# Patient Record
Sex: FEMALE | Race: BLACK OR AFRICAN AMERICAN | NOT HISPANIC OR LATINO | URBAN - METROPOLITAN AREA
[De-identification: names, ages, dates, MRNs, and addresses within clinical notes are randomized per-mention and may not be internally consistent; named-entity substitution may affect disease eponyms.]

---

## 2018-12-01 ENCOUNTER — OUTPATIENT (OUTPATIENT)
Dept: OUTPATIENT SERVICES | Facility: HOSPITAL | Age: 43
LOS: 1 days | End: 2018-12-01
Payer: MEDICAID

## 2018-12-01 PROCEDURE — G9001: CPT

## 2018-12-07 ENCOUNTER — OUTPATIENT (OUTPATIENT)
Dept: OUTPATIENT SERVICES | Facility: HOSPITAL | Age: 43
LOS: 1 days | End: 2018-12-07
Payer: MEDICAID

## 2018-12-07 VITALS
RESPIRATION RATE: 14 BRPM | TEMPERATURE: 98 F | SYSTOLIC BLOOD PRESSURE: 130 MMHG | HEART RATE: 85 BPM | DIASTOLIC BLOOD PRESSURE: 80 MMHG | WEIGHT: 201.06 LBS | HEIGHT: 66.5 IN | OXYGEN SATURATION: 98 %

## 2018-12-07 DIAGNOSIS — Z98.891 HISTORY OF UTERINE SCAR FROM PREVIOUS SURGERY: Chronic | ICD-10-CM

## 2018-12-07 DIAGNOSIS — D25.9 LEIOMYOMA OF UTERUS, UNSPECIFIED: ICD-10-CM

## 2018-12-07 DIAGNOSIS — D21.9 BENIGN NEOPLASM OF CONNECTIVE AND OTHER SOFT TISSUE, UNSPECIFIED: ICD-10-CM

## 2018-12-07 DIAGNOSIS — I10 ESSENTIAL (PRIMARY) HYPERTENSION: ICD-10-CM

## 2018-12-07 DIAGNOSIS — Z98.890 OTHER SPECIFIED POSTPROCEDURAL STATES: Chronic | ICD-10-CM

## 2018-12-07 LAB
BLD GP AB SCN SERPL QL: NEGATIVE — SIGNIFICANT CHANGE UP
BUN SERPL-MCNC: 7 MG/DL — SIGNIFICANT CHANGE UP (ref 7–23)
CALCIUM SERPL-MCNC: 9 MG/DL — SIGNIFICANT CHANGE UP (ref 8.4–10.5)
CHLORIDE SERPL-SCNC: 102 MMOL/L — SIGNIFICANT CHANGE UP (ref 98–107)
CO2 SERPL-SCNC: 24 MMOL/L — SIGNIFICANT CHANGE UP (ref 22–31)
CREAT SERPL-MCNC: 0.87 MG/DL — SIGNIFICANT CHANGE UP (ref 0.5–1.3)
GLUCOSE SERPL-MCNC: 80 MG/DL — SIGNIFICANT CHANGE UP (ref 70–99)
HBA1C BLD-MCNC: 5.5 % — SIGNIFICANT CHANGE UP (ref 4–5.6)
HCT VFR BLD CALC: 35.7 % — SIGNIFICANT CHANGE UP (ref 34.5–45)
HGB BLD-MCNC: 11.1 G/DL — LOW (ref 11.5–15.5)
MCHC RBC-ENTMCNC: 25.6 PG — LOW (ref 27–34)
MCHC RBC-ENTMCNC: 31.1 % — LOW (ref 32–36)
MCV RBC AUTO: 82.3 FL — SIGNIFICANT CHANGE UP (ref 80–100)
NRBC # FLD: 0 — SIGNIFICANT CHANGE UP
PLATELET # BLD AUTO: 264 K/UL — SIGNIFICANT CHANGE UP (ref 150–400)
PMV BLD: 11.4 FL — SIGNIFICANT CHANGE UP (ref 7–13)
POTASSIUM SERPL-MCNC: 3.9 MMOL/L — SIGNIFICANT CHANGE UP (ref 3.5–5.3)
POTASSIUM SERPL-SCNC: 3.9 MMOL/L — SIGNIFICANT CHANGE UP (ref 3.5–5.3)
RBC # BLD: 4.34 M/UL — SIGNIFICANT CHANGE UP (ref 3.8–5.2)
RBC # FLD: 15.6 % — HIGH (ref 10.3–14.5)
RH IG SCN BLD-IMP: POSITIVE — SIGNIFICANT CHANGE UP
SODIUM SERPL-SCNC: 137 MMOL/L — SIGNIFICANT CHANGE UP (ref 135–145)
WBC # BLD: 8.87 K/UL — SIGNIFICANT CHANGE UP (ref 3.8–10.5)
WBC # FLD AUTO: 8.87 K/UL — SIGNIFICANT CHANGE UP (ref 3.8–10.5)

## 2018-12-07 PROCEDURE — 93010 ELECTROCARDIOGRAM REPORT: CPT

## 2018-12-07 RX ORDER — SODIUM CHLORIDE 9 MG/ML
1000 INJECTION, SOLUTION INTRAVENOUS
Qty: 0 | Refills: 0 | Status: DISCONTINUED | OUTPATIENT
Start: 2018-12-21 | End: 2018-12-22

## 2018-12-07 NOTE — H&P PST ADULT - HISTORY OF PRESENT ILLNESS
43 yr old female with medical hx of htn and anemia presents for preop evaluation with c/o "heavy vaginal bleeding and abdominal pains with menstruation and dyspareunia.  Pt s/p sonogram and MRI and was dx with Leiomyoma of Uterus.  Pt is now scheduled for Abdominal Supracervical Hysterectomy, Bilateral Salpingectomy on 12/21/18.

## 2018-12-07 NOTE — H&P PST ADULT - ASSESSMENT
44 yo P3 with symptomatic fibroid uterus presents to hospital for abdominal supracervical hysterectomy and b/l salpingectomy. The patient has long standing history of heavy menses, abdominal pain, increased abdominal girth, bloating. Pelvic ultrasound reveals enlarged myomatous uterus ~ 16-18wk size with a large 8cm intracavitary myoma. The patient was counseled about the associated risks and benefits of all options including but not limited to lupron, embolization, myomectomy, etc. The patient is requesting definitive therapy because she has completed childbearing and is nervous fibroids will grow back.  Consent obtained and witnessed in the office and in the hospital. Risks of surgery include but not limited to scar tissue formation, bleeding, infection, injury to nearby organs especially due to history of abdominal surgeries. The patient verbalized understanding. All questions and concerns addressed to full satisfaction. Anticipate uncomplicated intraop and postop course. MBeauvil

## 2018-12-07 NOTE — H&P PST ADULT - PROBLEM SELECTOR PLAN 1
Scheduled for Abdominal Supracervical Hysterectomy, Bilateral Salpingectomy on 12/21/18.  Lab results pending.  Preop, famotidine, chlorhexidine and urine collection instructions provided and questions addressed.

## 2018-12-07 NOTE — H&P PST ADULT - RS GEN PE MLT RESP DETAILS PC
no wheezes/respirations non-labored/no rhonchi/clear to auscultation bilaterally/no rales/breath sounds equal

## 2018-12-21 ENCOUNTER — INPATIENT (INPATIENT)
Facility: HOSPITAL | Age: 43
LOS: 1 days | Discharge: HOME | End: 2018-12-23
Attending: OBSTETRICS & GYNECOLOGY | Admitting: OBSTETRICS & GYNECOLOGY
Payer: MEDICAID

## 2018-12-21 VITALS
SYSTOLIC BLOOD PRESSURE: 138 MMHG | HEART RATE: 95 BPM | WEIGHT: 201.06 LBS | HEIGHT: 66.5 IN | DIASTOLIC BLOOD PRESSURE: 94 MMHG | OXYGEN SATURATION: 99 % | TEMPERATURE: 98 F | RESPIRATION RATE: 15 BRPM

## 2018-12-21 DIAGNOSIS — Z98.891 HISTORY OF UTERINE SCAR FROM PREVIOUS SURGERY: Chronic | ICD-10-CM

## 2018-12-21 DIAGNOSIS — Z98.890 OTHER SPECIFIED POSTPROCEDURAL STATES: Chronic | ICD-10-CM

## 2018-12-21 DIAGNOSIS — D25.9 LEIOMYOMA OF UTERUS, UNSPECIFIED: ICD-10-CM

## 2018-12-21 LAB
GLUCOSE BLDC GLUCOMTR-MCNC: 89 MG/DL — SIGNIFICANT CHANGE UP (ref 70–99)
HCG UR QL: NEGATIVE — SIGNIFICANT CHANGE UP
RH IG SCN BLD-IMP: POSITIVE — SIGNIFICANT CHANGE UP

## 2018-12-21 PROCEDURE — 88307 TISSUE EXAM BY PATHOLOGIST: CPT | Mod: 26

## 2018-12-21 PROCEDURE — 88302 TISSUE EXAM BY PATHOLOGIST: CPT | Mod: 26

## 2018-12-21 RX ORDER — AMLODIPINE BESYLATE 2.5 MG/1
1 TABLET ORAL
Qty: 0 | Refills: 0 | COMMUNITY

## 2018-12-21 RX ORDER — SIMETHICONE 80 MG/1
80 TABLET, CHEWABLE ORAL EVERY 4 HOURS
Qty: 0 | Refills: 0 | Status: DISCONTINUED | OUTPATIENT
Start: 2018-12-21 | End: 2018-12-23

## 2018-12-21 RX ORDER — ONDANSETRON 8 MG/1
4 TABLET, FILM COATED ORAL EVERY 6 HOURS
Qty: 0 | Refills: 0 | Status: DISCONTINUED | OUTPATIENT
Start: 2018-12-21 | End: 2018-12-22

## 2018-12-21 RX ORDER — DOCUSATE SODIUM 100 MG
100 CAPSULE ORAL THREE TIMES A DAY
Qty: 0 | Refills: 0 | Status: DISCONTINUED | OUTPATIENT
Start: 2018-12-21 | End: 2018-12-23

## 2018-12-21 RX ORDER — ONDANSETRON 8 MG/1
4 TABLET, FILM COATED ORAL EVERY 6 HOURS
Qty: 0 | Refills: 0 | Status: DISCONTINUED | OUTPATIENT
Start: 2018-12-21 | End: 2018-12-23

## 2018-12-21 RX ORDER — ACETAMINOPHEN 500 MG
1000 TABLET ORAL ONCE
Qty: 0 | Refills: 0 | Status: DISCONTINUED | OUTPATIENT
Start: 2018-12-21 | End: 2018-12-22

## 2018-12-21 RX ORDER — OXYCODONE HYDROCHLORIDE 5 MG/1
1 TABLET ORAL
Qty: 20 | Refills: 0 | OUTPATIENT
Start: 2018-12-21 | End: 2018-12-25

## 2018-12-21 RX ORDER — HYDROMORPHONE HYDROCHLORIDE 2 MG/ML
0.5 INJECTION INTRAMUSCULAR; INTRAVENOUS; SUBCUTANEOUS
Qty: 0 | Refills: 0 | Status: DISCONTINUED | OUTPATIENT
Start: 2018-12-21 | End: 2018-12-22

## 2018-12-21 RX ORDER — HEPARIN SODIUM 5000 [USP'U]/ML
5000 INJECTION INTRAVENOUS; SUBCUTANEOUS EVERY 12 HOURS
Qty: 0 | Refills: 0 | Status: DISCONTINUED | OUTPATIENT
Start: 2018-12-21 | End: 2018-12-23

## 2018-12-21 RX ORDER — HYDROMORPHONE HYDROCHLORIDE 2 MG/ML
0.5 INJECTION INTRAMUSCULAR; INTRAVENOUS; SUBCUTANEOUS
Qty: 0 | Refills: 0 | Status: DISCONTINUED | OUTPATIENT
Start: 2018-12-21 | End: 2018-12-21

## 2018-12-21 RX ORDER — SODIUM CHLORIDE 9 MG/ML
1000 INJECTION, SOLUTION INTRAVENOUS
Qty: 0 | Refills: 0 | Status: DISCONTINUED | OUTPATIENT
Start: 2018-12-21 | End: 2018-12-22

## 2018-12-21 RX ORDER — HYDROMORPHONE HYDROCHLORIDE 2 MG/ML
30 INJECTION INTRAMUSCULAR; INTRAVENOUS; SUBCUTANEOUS
Qty: 0 | Refills: 0 | Status: DISCONTINUED | OUTPATIENT
Start: 2018-12-21 | End: 2018-12-22

## 2018-12-21 RX ORDER — NALOXONE HYDROCHLORIDE 4 MG/.1ML
0.1 SPRAY NASAL
Qty: 0 | Refills: 0 | Status: DISCONTINUED | OUTPATIENT
Start: 2018-12-21 | End: 2018-12-22

## 2018-12-21 RX ORDER — KETOROLAC TROMETHAMINE 30 MG/ML
30 SYRINGE (ML) INJECTION EVERY 6 HOURS
Qty: 0 | Refills: 0 | Status: DISCONTINUED | OUTPATIENT
Start: 2018-12-21 | End: 2018-12-22

## 2018-12-21 RX ADMIN — Medication 30 MILLIGRAM(S): at 23:17

## 2018-12-21 RX ADMIN — SODIUM CHLORIDE 125 MILLILITER(S): 9 INJECTION, SOLUTION INTRAVENOUS at 21:59

## 2018-12-21 RX ADMIN — HEPARIN SODIUM 5000 UNIT(S): 5000 INJECTION INTRAVENOUS; SUBCUTANEOUS at 23:16

## 2018-12-21 RX ADMIN — HYDROMORPHONE HYDROCHLORIDE 30 MILLILITER(S): 2 INJECTION INTRAMUSCULAR; INTRAVENOUS; SUBCUTANEOUS at 20:26

## 2018-12-21 RX ADMIN — Medication 100 MILLIGRAM(S): at 23:17

## 2018-12-21 RX ADMIN — HYDROMORPHONE HYDROCHLORIDE 30 MILLILITER(S): 2 INJECTION INTRAMUSCULAR; INTRAVENOUS; SUBCUTANEOUS at 21:58

## 2018-12-21 RX ADMIN — Medication 30 MILLIGRAM(S): at 23:50

## 2018-12-21 NOTE — ASU PREOP CHECKLIST - SELECT TESTS ORDERED
Type and Screen/CBC/Type and Cross/UCG/BMP Type and Cross/Type and Screen/BMP/CBC/89/POCT Blood Glucose/UCG

## 2018-12-21 NOTE — DISCHARGE NOTE ADULT - HOSPITAL COURSE
44y/o s/p abdominal FRANCINE-bs   2/2  fibroid uterus  EBL:300    HCT35.7->    .  See operative note for details of procedure.      ???  POD#0, patient was advanced to a regular diet.  POD#1, gates was discontinued and patient voided spontaneously.  Patient was discharged on POD#2 in stable condition with adequate pain control, ambulating, tolerating PO and voiding spontaneously.  Patient to follow up with    in 2 weeks. 42y/o s/p abdominal FRANCINE-bs   2/2  fibroid uterus  EBL:300    HCT35.7->33.5    .  See operative note for details of procedure.      ???  POD#0, patient was advanced to a regular diet.  POD#1, gates was discontinued and patient voided spontaneously.  Patient was discharged on POD#2 in stable condition with adequate pain control, ambulating, tolerating PO and voiding spontaneously.  Patient to follow up with Dr. Palencia  in 2 weeks.

## 2018-12-21 NOTE — DISCHARGE NOTE ADULT - CARE PROVIDER_API CALL
Shruthi Bunch (DO), Obstetrics and Gynecology  [address] [place] [address]  Phone: [phone]  Fax: [phone]

## 2018-12-21 NOTE — DISCHARGE NOTE ADULT - CARE PLAN
Principal Discharge DX:	Postoperative state  Goal:	recovery  Assessment and plan of treatment:	nothing in vagina (sex, tampons, douching) no heavy lifting (>10 lbs) for 6 weeks. Please return to ER or call your doctors office if pain is worsening, you are unable to keep down food or drink, fever >101, heavy vaginal bleeding. You are able to take a shower regularly.

## 2018-12-21 NOTE — DISCHARGE NOTE ADULT - PATIENT PORTAL LINK FT
You can access the [facility] Patient Portal, offered by [facility], by registering with the following website: [email or web address]

## 2018-12-22 DIAGNOSIS — Z98.890 OTHER SPECIFIED POSTPROCEDURAL STATES: ICD-10-CM

## 2018-12-22 LAB
BASOPHILS # BLD AUTO: 0.02 K/UL — SIGNIFICANT CHANGE UP (ref 0–0.2)
BASOPHILS NFR BLD AUTO: 0.1 % — SIGNIFICANT CHANGE UP (ref 0–2)
BLD GP AB SCN SERPL QL: NEGATIVE — SIGNIFICANT CHANGE UP
EOSINOPHIL # BLD AUTO: 0 K/UL — SIGNIFICANT CHANGE UP (ref 0–0.5)
EOSINOPHIL NFR BLD AUTO: 0 % — SIGNIFICANT CHANGE UP (ref 0–6)
HCT VFR BLD CALC: 33.5 % — LOW (ref 34.5–45)
HGB BLD-MCNC: 10.4 G/DL — LOW (ref 11.5–15.5)
IMM GRANULOCYTES # BLD AUTO: 0.07 # — SIGNIFICANT CHANGE UP
IMM GRANULOCYTES NFR BLD AUTO: 0.4 % — SIGNIFICANT CHANGE UP (ref 0–1.5)
LYMPHOCYTES # BLD AUTO: 0.96 K/UL — LOW (ref 1–3.3)
LYMPHOCYTES # BLD AUTO: 6.1 % — LOW (ref 13–44)
MCHC RBC-ENTMCNC: 26.3 PG — LOW (ref 27–34)
MCHC RBC-ENTMCNC: 31 % — LOW (ref 32–36)
MCV RBC AUTO: 84.8 FL — SIGNIFICANT CHANGE UP (ref 80–100)
MONOCYTES # BLD AUTO: 0.86 K/UL — SIGNIFICANT CHANGE UP (ref 0–0.9)
MONOCYTES NFR BLD AUTO: 5.4 % — SIGNIFICANT CHANGE UP (ref 2–14)
NEUTROPHILS # BLD AUTO: 13.89 K/UL — HIGH (ref 1.8–7.4)
NEUTROPHILS NFR BLD AUTO: 88 % — HIGH (ref 43–77)
NRBC # FLD: 0 — SIGNIFICANT CHANGE UP
PLATELET # BLD AUTO: 229 K/UL — SIGNIFICANT CHANGE UP (ref 150–400)
PMV BLD: 11.5 FL — SIGNIFICANT CHANGE UP (ref 7–13)
RBC # BLD: 3.95 M/UL — SIGNIFICANT CHANGE UP (ref 3.8–5.2)
RBC # FLD: 14.4 % — SIGNIFICANT CHANGE UP (ref 10.3–14.5)
RH IG SCN BLD-IMP: POSITIVE — SIGNIFICANT CHANGE UP
WBC # BLD: 15.8 K/UL — HIGH (ref 3.8–10.5)
WBC # FLD AUTO: 15.8 K/UL — HIGH (ref 3.8–10.5)

## 2018-12-22 RX ORDER — AMLODIPINE BESYLATE 2.5 MG/1
5 TABLET ORAL DAILY
Qty: 0 | Refills: 0 | Status: DISCONTINUED | OUTPATIENT
Start: 2018-12-22 | End: 2018-12-23

## 2018-12-22 RX ORDER — AMLODIPINE BESYLATE 2.5 MG/1
5 TABLET ORAL DAILY
Qty: 0 | Refills: 0 | Status: DISCONTINUED | OUTPATIENT
Start: 2018-12-22 | End: 2018-12-22

## 2018-12-22 RX ORDER — OXYCODONE HYDROCHLORIDE 5 MG/1
5 TABLET ORAL ONCE
Qty: 0 | Refills: 0 | Status: DISCONTINUED | OUTPATIENT
Start: 2018-12-22 | End: 2018-12-22

## 2018-12-22 RX ORDER — ACETAMINOPHEN 500 MG
975 TABLET ORAL EVERY 6 HOURS
Qty: 0 | Refills: 0 | Status: DISCONTINUED | OUTPATIENT
Start: 2018-12-22 | End: 2018-12-23

## 2018-12-22 RX ORDER — IBUPROFEN 200 MG
600 TABLET ORAL EVERY 6 HOURS
Qty: 0 | Refills: 0 | Status: DISCONTINUED | OUTPATIENT
Start: 2018-12-22 | End: 2018-12-23

## 2018-12-22 RX ORDER — OXYCODONE HYDROCHLORIDE 5 MG/1
5 TABLET ORAL EVERY 4 HOURS
Qty: 0 | Refills: 0 | Status: DISCONTINUED | OUTPATIENT
Start: 2018-12-22 | End: 2018-12-23

## 2018-12-22 RX ORDER — OXYCODONE HYDROCHLORIDE 5 MG/1
1 TABLET ORAL
Qty: 20 | Refills: 0 | OUTPATIENT
Start: 2018-12-22 | End: 2018-12-26

## 2018-12-22 RX ADMIN — OXYCODONE HYDROCHLORIDE 5 MILLIGRAM(S): 5 TABLET ORAL at 23:01

## 2018-12-22 RX ADMIN — Medication 600 MILLIGRAM(S): at 15:01

## 2018-12-22 RX ADMIN — SIMETHICONE 80 MILLIGRAM(S): 80 TABLET, CHEWABLE ORAL at 23:02

## 2018-12-22 RX ADMIN — Medication 30 MILLIGRAM(S): at 13:01

## 2018-12-22 RX ADMIN — Medication 30 MILLIGRAM(S): at 05:40

## 2018-12-22 RX ADMIN — HEPARIN SODIUM 5000 UNIT(S): 5000 INJECTION INTRAVENOUS; SUBCUTANEOUS at 05:11

## 2018-12-22 RX ADMIN — Medication 975 MILLIGRAM(S): at 23:32

## 2018-12-22 RX ADMIN — OXYCODONE HYDROCHLORIDE 5 MILLIGRAM(S): 5 TABLET ORAL at 21:00

## 2018-12-22 RX ADMIN — Medication 100 MILLIGRAM(S): at 05:11

## 2018-12-22 RX ADMIN — Medication 30 MILLIGRAM(S): at 13:40

## 2018-12-22 RX ADMIN — Medication 975 MILLIGRAM(S): at 17:23

## 2018-12-22 RX ADMIN — Medication 100 MILLIGRAM(S): at 23:02

## 2018-12-22 RX ADMIN — OXYCODONE HYDROCHLORIDE 5 MILLIGRAM(S): 5 TABLET ORAL at 16:05

## 2018-12-22 RX ADMIN — Medication 600 MILLIGRAM(S): at 23:02

## 2018-12-22 RX ADMIN — Medication 975 MILLIGRAM(S): at 23:02

## 2018-12-22 RX ADMIN — Medication 600 MILLIGRAM(S): at 16:00

## 2018-12-22 RX ADMIN — SIMETHICONE 80 MILLIGRAM(S): 80 TABLET, CHEWABLE ORAL at 15:01

## 2018-12-22 RX ADMIN — SODIUM CHLORIDE 125 MILLILITER(S): 9 INJECTION, SOLUTION INTRAVENOUS at 07:25

## 2018-12-22 RX ADMIN — HYDROMORPHONE HYDROCHLORIDE 30 MILLILITER(S): 2 INJECTION INTRAMUSCULAR; INTRAVENOUS; SUBCUTANEOUS at 07:29

## 2018-12-22 RX ADMIN — OXYCODONE HYDROCHLORIDE 5 MILLIGRAM(S): 5 TABLET ORAL at 16:00

## 2018-12-22 RX ADMIN — OXYCODONE HYDROCHLORIDE 5 MILLIGRAM(S): 5 TABLET ORAL at 23:31

## 2018-12-22 RX ADMIN — Medication 30 MILLIGRAM(S): at 05:11

## 2018-12-22 RX ADMIN — Medication 600 MILLIGRAM(S): at 23:32

## 2018-12-22 RX ADMIN — HEPARIN SODIUM 5000 UNIT(S): 5000 INJECTION INTRAVENOUS; SUBCUTANEOUS at 17:23

## 2018-12-22 RX ADMIN — OXYCODONE HYDROCHLORIDE 5 MILLIGRAM(S): 5 TABLET ORAL at 21:30

## 2018-12-22 RX ADMIN — OXYCODONE HYDROCHLORIDE 5 MILLIGRAM(S): 5 TABLET ORAL at 17:00

## 2018-12-22 RX ADMIN — Medication 100 MILLIGRAM(S): at 13:02

## 2018-12-22 RX ADMIN — OXYCODONE HYDROCHLORIDE 5 MILLIGRAM(S): 5 TABLET ORAL at 15:01

## 2018-12-22 NOTE — PROGRESS NOTE ADULT - SUBJECTIVE AND OBJECTIVE BOX
INTERVAL HPI/OVERNIGHT EVENTS: Pt seen and examined at bedside. Patient is ambulating, tolerating regular diet, no n/v, voiding.  no flatus yet. c/o of incisional pain.  MEDICATIONS  (STANDING):  acetaminophen   Tablet .. 975 milliGRAM(s) Oral every 6 hours  amLODIPine   Tablet 5 milliGRAM(s) Oral daily  docusate sodium 100 milliGRAM(s) Oral three times a day  heparin  Injectable 5000 Unit(s) SubCutaneous every 12 hours  ibuprofen  Tablet. 600 milliGRAM(s) Oral every 6 hours  lactated ringers. 1000 milliLiter(s) (125 mL/Hr) IV Continuous <Continuous>  lactated ringers. 1000 milliLiter(s) (30 mL/Hr) IV Continuous <Continuous>  ondansetron Injectable 4 milliGRAM(s) IV Push every 6 hours    MEDICATIONS  (PRN):  oxyCODONE    IR 5 milliGRAM(s) Oral every 4 hours PRN Severe Pain (7 - 10)  simethicone 80 milliGRAM(s) Chew every 4 hours PRN Gas      Allergies    No Known Allergies    Vital Signs Last 24 Hrs Afebrile.   T(C): 36.9 (22 Dec 2018 14:00), Max: 37.1 (21 Dec 2018 19:00)  T(F): 98.5 (22 Dec 2018 14:00), Max: 98.8 (21 Dec 2018 19:00)  HR: 58 (22 Dec 2018 14:00) (57 - 97)  BP: 128/80 (22 Dec 2018 14:00) (110/61 - 128/80)  BP(mean): 82 (21 Dec 2018 21:00) (72 - 87)  RR: 18 (22 Dec 2018 14:00) (11 - 23)  SpO2: 99% (22 Dec 2018 14:00) (93% - 100%)      PHYSICAL EXAM:    GA: NAD, A+0 x 3  Abd: ( + ) BS, soft, nontender, nondistended, no rebound or guarding,   Incision: clean, dry and intact; steri-strips in place  Extremities: no swelling or calf tenderness, reflexes +2 bilaterally          LABS:                        10.4   15.80 )-----------( 229      ( 22 Dec 2018 05:25 )             33.5

## 2018-12-22 NOTE — CHART NOTE - NSCHARTNOTEFT_GEN_A_CORE
POST-OP CHECK    Allergies    No Known Allergies    Intolerances        S: Pt awake and alert resting comfortably in bed.  Pain controlled. Pt denies N/V, SOB, CP, palpitations.    O:   T(C): 37.1 (18 @ 22:00), Max: 37.1 (18 @ 22:00)  HR: 86 (18 @ 22:00) (77 - 86)  BP: 126/93 (18 @ 22:00) (114/70 - 126/93)  RR: 16 (18 @ 22:00) (11 - 16)  SpO2: 99% (18 @ 22:00) (97% - 99%)  Wt(kg): --  I&O's Summary    21 Dec 2018 07:01  -  22 Dec 2018 00:46  --------------------------------------------------------  IN: 375 mL / OUT: 670 mL / NET: -295 mL        Heart: S1S2, RRR  Lungs: CTA B/L  Abd: soft, appropriately tender, occassional BS x 4 quadrants  Inc: Clean/dry/intact  Pelvic:  bleeding, vaginal paking  Ext: PAS in place, Neg Homans B/L    A/P: 43y Female S/P abdominal Supracervical Hysterectomy, bilateral salpingectomy  with PMHx of   PAST MEDICAL & SURGICAL HISTORY:  Leiomyoma  Anemia  Essential hypertension  History of abdominoplasty  History of : x2    Stable    -Continue post-op care  -Analgesia in PACU & prn  -OOB with assistance x 2, then Ad Rhina  -Meds:   acetaminophen  IVPB .. 1000 milliGRAM(s) IV Intermittent once  docusate sodium 100 milliGRAM(s) Oral three times a day  heparin  Injectable 5000 Unit(s) SubCutaneous every 12 hours  HYDROmorphone PCA (1 mG/mL) 30 milliLiter(s) PCA Continuous PCA Continuous  HYDROmorphone PCA (1 mG/mL) Rescue Clinician Bolus 0.5 milliGRAM(s) IV Push every 15 minutes PRN  ketorolac   Injectable 30 milliGRAM(s) IV Push every 6 hours  lactated ringers. 1000 milliLiter(s) IV Continuous <Continuous>  lactated ringers. 1000 milliLiter(s) IV Continuous <Continuous>  naloxone Injectable 0.1 milliGRAM(s) IV Push every 3 minutes PRN  ondansetron Injectable 4 milliGRAM(s) IV Push every 6 hours PRN  ondansetron Injectable 4 milliGRAM(s) IV Push every 6 hours  simethicone 80 milliGRAM(s) Chew every 4 hours PRN        -IVFluids- LR @125  -Diet-  Advance as Tolerated to Reg  - Velez to gravity  -PAS   -CBC in AM tomorrow    Chetan Antony MD PGY2

## 2018-12-22 NOTE — PROGRESS NOTE ADULT - SUBJECTIVE AND OBJECTIVE BOX
Patient seen and examined at bedside, no acute overnight events. No acute complaints, pain well controlled.  Patient is ambulating and tolerating clears. Has not yet passed flatus. Velez is still in place. Denies CP, SOB, N/V, fevers, and chills.    Vital Signs Last 24 Hours  T(C): 36.9 (12-22-18 @ 05:16), Max: 37.1 (12-21-18 @ 19:00)  HR: 75 (12-22-18 @ 05:16) (75 - 97)  BP: 110/61 (12-22-18 @ 05:16) (110/61 - 138/94)  RR: 16 (12-22-18 @ 05:16) (11 - 23)  SpO2: 99% (12-22-18 @ 05:16) (93% - 100%)    I&O's Summary    21 Dec 2018 07:01  -  22 Dec 2018 05:50  --------------------------------------------------------  IN: 875 mL / OUT: 1370 mL / NET: -495 mL        Physical Exam:  General: NAD  CV: NR, RR, S1, S2, no M/R/G  Lungs: CTA-B  Abdomen: Soft, appropriately-tender around incision, non-distended, +BS  Incision: _ CDI  Ext: No pain or swelling    Labs:      MEDICATIONS  (STANDING):  acetaminophen  IVPB .. 1000 milliGRAM(s) IV Intermittent once  docusate sodium 100 milliGRAM(s) Oral three times a day  heparin  Injectable 5000 Unit(s) SubCutaneous every 12 hours  HYDROmorphone PCA (1 mG/mL) 30 milliLiter(s) PCA Continuous PCA Continuous  ketorolac   Injectable 30 milliGRAM(s) IV Push every 6 hours  lactated ringers. 1000 milliLiter(s) (30 mL/Hr) IV Continuous <Continuous>  lactated ringers. 1000 milliLiter(s) (125 mL/Hr) IV Continuous <Continuous>  ondansetron Injectable 4 milliGRAM(s) IV Push every 6 hours    MEDICATIONS  (PRN):  HYDROmorphone PCA (1 mG/mL) Rescue Clinician Bolus 0.5 milliGRAM(s) IV Push every 15 minutes PRN for Pain Scale GREATER THAN 6  naloxone Injectable 0.1 milliGRAM(s) IV Push every 3 minutes PRN For ANY of the following changes in patient status:  A. RR LESS THAN 10 breaths per minute, B. Oxygen saturation LESS THAN 90%, C. Sedation score of 6  ondansetron Injectable 4 milliGRAM(s) IV Push every 6 hours PRN Nausea  simethicone 80 milliGRAM(s) Chew every 4 hours PRN Gas

## 2018-12-22 NOTE — PROGRESS NOTE ADULT - PROBLEM SELECTOR PLAN 1
Neuro: transition to po pain meds  CV: Hemodynamically stable. Will restart home antihypertensives this AM  Pulm: Saturating well on room air, encourage incentive spirometry  GI: Advance to regular diet  : UOP adequate, d/c kody  Heme: c/w HSQ and SCDs for DVT ppx  Dispo: Continue routine post-op care    JAIMIE Welch pgy2

## 2018-12-23 VITALS
OXYGEN SATURATION: 100 % | RESPIRATION RATE: 18 BRPM | HEART RATE: 85 BPM | SYSTOLIC BLOOD PRESSURE: 117 MMHG | TEMPERATURE: 98 F | DIASTOLIC BLOOD PRESSURE: 64 MMHG

## 2018-12-23 RX ORDER — ACETAMINOPHEN 500 MG
3 TABLET ORAL
Qty: 0 | Refills: 0 | COMMUNITY
Start: 2018-12-23

## 2018-12-23 RX ORDER — IBUPROFEN 200 MG
1 TABLET ORAL
Qty: 0 | Refills: 0 | COMMUNITY
Start: 2018-12-23

## 2018-12-23 RX ADMIN — Medication 100 MILLIGRAM(S): at 05:54

## 2018-12-23 RX ADMIN — Medication 975 MILLIGRAM(S): at 11:57

## 2018-12-23 RX ADMIN — Medication 600 MILLIGRAM(S): at 11:57

## 2018-12-23 NOTE — PROGRESS NOTE ADULT - SUBJECTIVE AND OBJECTIVE BOX
POD#   HD#    Patient seen and examined at bedside. No acute overnight events. No acute complaints, pain well controlled.  Patient is ambulating. She is passing flatus. Voiding spontaneously and tolerating regular diet. Denies CP, SOB, N/V, fevers, and chills.    Vital Signs Last 24 Hours  T(C): 36.8 (12-23-18 @ 05:49), Max: 36.9 (12-22-18 @ 14:00)  HR: 60 (12-23-18 @ 05:49) (57 - 69)  BP: 125/78 (12-23-18 @ 05:49) (101/61 - 128/80)  RR: 18 (12-23-18 @ 05:49) (17 - 18)  SpO2: 99% (12-23-18 @ 05:49) (98% - 100%)    I&O's Summary    22 Dec 2018 07:01  -  23 Dec 2018 07:00  --------------------------------------------------------  IN: 250 mL / OUT: 1300 mL / NET: -1050 mL        Physical Exam:  General: NAD  CV: RRR  Lungs: CTAB  Abdomen: Soft, non-tender, non-distended, +BS  Incision: C/D/I  Ext: No pain or swelling    Labs:                        10.4   15.80 )-----------( 229      ( 22 Dec 2018 05:25 )             33.5   baso 0.1    eos 0.0    imm gran 0.4    lymph 6.1    mono 5.4    poly 88.0       MEDICATIONS  (STANDING):  acetaminophen   Tablet .. 975 milliGRAM(s) Oral every 6 hours  amLODIPine   Tablet 5 milliGRAM(s) Oral daily  docusate sodium 100 milliGRAM(s) Oral three times a day  heparin  Injectable 5000 Unit(s) SubCutaneous every 12 hours  ibuprofen  Tablet. 600 milliGRAM(s) Oral every 6 hours  ondansetron Injectable 4 milliGRAM(s) IV Push every 6 hours    MEDICATIONS  (PRN):  oxyCODONE    IR 5 milliGRAM(s) Oral every 4 hours PRN Severe Pain (7 - 10)  simethicone 80 milliGRAM(s) Chew every 4 hours PRN Gas

## 2018-12-23 NOTE — PROGRESS NOTE ADULT - ASSESSMENT
42y/o POD#1 from Ex-lap, abdominal FRANCINE, BS for fibroids in stable condition.
44y/o POD#2 from Ex-lap, abdominal FRANCINE, BS for fibroids in stable condition. Patient meeting postoperative milestones.
POD1 s/o FRANCINE BS. Patient is doing well  plan to continue routine postop care  po analgesia prn  dvt prophylaxis  ambulate  incentive spirometry and dvt prophylaxis   evaluate for d/c in am
A/P: POD#2 s/p FRANCINE/BS, AUSTYN    Doing well  Stable VS and H/H  Ambulating, passing flatus  pain controlled  Surgery explained to patient  D/C home  D/C instructions given  F/U with Dr. Bunch in 2 weeks for Postop check

## 2018-12-23 NOTE — PROGRESS NOTE ADULT - SUBJECTIVE AND OBJECTIVE BOX
Pt seen and examined at bedside. Pt states mild abdominal pain. Pt [  ] ambulating, tolerating ___ diet, [  ] flatus, [  ]BM, [  ] urinating adequately.   Pt denies fever, chills, chest pain, SOB, nausea, vomiting, lightheadedness, dizziness.      T(F): 98.3 (12-23-18 @ 10:15), Max: 98.5 (12-22-18 @ 14:00)  HR: 85 (12-23-18 @ 10:15) (58 - 85)  BP: 117/64 (12-23-18 @ 10:15) (101/61 - 128/80)  RR: 18 (12-23-18 @ 10:15) (17 - 18)  SpO2: 100% (12-23-18 @ 10:15) (98% - 100%)  Wt(kg): --  I&O's Summary    22 Dec 2018 07:01  -  23 Dec 2018 07:00  --------------------------------------------------------  IN: 250 mL / OUT: 1300 mL / NET: -1050 mL        MEDICATIONS  (STANDING):  acetaminophen   Tablet .. 975 milliGRAM(s) Oral every 6 hours  amLODIPine   Tablet 5 milliGRAM(s) Oral daily  docusate sodium 100 milliGRAM(s) Oral three times a day  heparin  Injectable 5000 Unit(s) SubCutaneous every 12 hours  ibuprofen  Tablet. 600 milliGRAM(s) Oral every 6 hours  ondansetron Injectable 4 milliGRAM(s) IV Push every 6 hours    MEDICATIONS  (PRN):  oxyCODONE    IR 5 milliGRAM(s) Oral every 4 hours PRN Severe Pain (7 - 10)  simethicone 80 milliGRAM(s) Chew every 4 hours PRN Gas      Physical Exam:  Constitutional: NAD   Abdomen: incision site clean, dry, intact. Soft, mildly tender, [  ] distended, no guarding, no rebound, [  ] bowel sounds  Extremities: no lower extremity edema or calve tenderness. SCDs in place     LABS:                        10.4   15.80 )-----------( 229      ( 22 Dec 2018 05:25 )             33.5                 RADIOLOGY & ADDITIONAL TESTS: Gyn Attending on call; POD#2 s/p FRANCINE/BS, AUSTYN    Pt seen and examined at bedside. Pt states mild abdominal pain. Pt [ is ] ambulating, tolerating __regular_ diet, [ + ] flatus, [ NO ]BM, [ + ] urinating adequately.   Pt denies fever, chills, chest pain, SOB, nausea, vomiting, lightheadedness, dizziness.      T(F): 98.3 (12-23-18 @ 10:15), Max: 98.5 (12-22-18 @ 14:00)  HR: 85 (12-23-18 @ 10:15) (58 - 85)  BP: 117/64 (12-23-18 @ 10:15) (101/61 - 128/80)  RR: 18 (12-23-18 @ 10:15) (17 - 18)  SpO2: 100% (12-23-18 @ 10:15) (98% - 100%)  Wt(kg): --  I&O's Summary    22 Dec 2018 07:01  -  23 Dec 2018 07:00  --------------------------------------------------------  IN: 250 mL / OUT: 1300 mL / NET: -1050 mL        MEDICATIONS  (STANDING):  acetaminophen   Tablet .. 975 milliGRAM(s) Oral every 6 hours  amLODIPine   Tablet 5 milliGRAM(s) Oral daily  docusate sodium 100 milliGRAM(s) Oral three times a day  heparin  Injectable 5000 Unit(s) SubCutaneous every 12 hours  ibuprofen  Tablet. 600 milliGRAM(s) Oral every 6 hours  ondansetron Injectable 4 milliGRAM(s) IV Push every 6 hours    MEDICATIONS  (PRN):  oxyCODONE    IR 5 milliGRAM(s) Oral every 4 hours PRN Severe Pain (7 - 10)  simethicone 80 milliGRAM(s) Chew every 4 hours PRN Gas      Physical Exam:  Constitutional: NAD   Abdomen: incision site clean, dry, intact. Soft, mildly tender, [  ] distended, no guarding, no rebound, [  ] bowel sounds  Extremities: no lower extremity edema or calve tenderness. SCDs in place     LABS:                        10.4   15.80 )-----------( 229      ( 22 Dec 2018 05:25 )             33.5                 RADIOLOGY & ADDITIONAL TESTS:

## 2018-12-23 NOTE — PROGRESS NOTE ADULT - PROBLEM SELECTOR PLAN 1
Neuro: c/w po pain meds  CV: Hemodynamically stable  Pulm: Saturating well on room air, encourage incentive spirometry  GI: c/w regular diet  : voiding spontaneously  Heme: c/w HSQ and SCDs for DVT ppx  Dispo: Home     JAIMIE Welch pgy2

## 2018-12-24 DIAGNOSIS — Z71.89 OTHER SPECIFIED COUNSELING: ICD-10-CM

## 2018-12-28 LAB — SURGICAL PATHOLOGY STUDY: SIGNIFICANT CHANGE UP

## 2020-02-20 NOTE — H&P PST ADULT - NS MD HP PULSE CAROTID
Isotretinoin Counseling: Patient should get monthly blood tests, not donate blood, not drive at night if vision affected, not share medication, and not undergo elective surgery for 6 months after tx completed. Side effects reviewed, pt to contact office should one occur. right normal/left normal

## 2021-06-15 NOTE — PATIENT PROFILE ADULT - PRIMARY SOURCE OF SUPPORT/COMFORT
liana rapid responses run over past 24h  on bipap this am  unclear how much she comprehends about her clinical situation as her attitude is aggressive and confrontational in an inappropriate manner  remains at risk of abrupt decompensation  moving to icu for chest tube    Upon my evaluation, this patient is at high risk for imminent or life threatening deterioration due to resp failure and other active medical issues which require my direct attention, intervention, and personal management.  I have personally spent 35 discontinuous minutes  of critical care time exclusive of time spent on separate billing procedures. This includes review of laboratory data, radiology results, discussion with primary team\patient, and monitoring for potential decompensation Interventions were performed as documented above. significant other/child(primitivo)

## 2021-11-28 NOTE — H&P PST ADULT - SYMPTOMS
----- Message from Rolly Zelaya [phone]  ---------------------------------------------------------------------------  --------------  Unknown Dura INFO  What is the best way for the office to contact you? Do not leave any   message, patient will call back for answer  Preferred Call Back Phone Number? [phone]  ---------------------------------------------------------------------------  --------------  SCRIPT ANSWERS  Relationship to Patient?  Third Party  Representative Name? [facility] none

## 2022-10-11 NOTE — H&P PST ADULT - SCARS
[FreeTextEntry1] : see lab  orders  \par \par see immunization orders \par \par  abdomen well healed scar/location

## 2024-08-29 NOTE — DISCHARGE NOTE ADULT - MEDICATION SUMMARY - MEDICATIONS TO TAKE
Detail Level: Zone
I will START or STAY ON the medications listed below when I get home from the hospital:    oxyCODONE 5 mg oral tablet  -- 1 tab(s) by mouth every 6 hours MDD:4 tabs  -- Caution federal law prohibits the transfer of this drug to any person other  than the person for whom it was prescribed.  It is very important that you take or use this exactly as directed.  Do not skip doses or discontinue unless directed by your doctor.  May cause drowsiness.  Alcohol may intensify this effect.  Use care when operating dangerous machinery.  This prescription cannot be refilled.  Using more of this medication than prescribed may cause serious breathing problems.    -- Indication: For Pain    acetaminophen 325 mg oral tablet  -- 3 tab(s) by mouth every 6 hours  -- Indication: For Pain    ibuprofen 600 mg oral tablet  -- 1 tab(s) by mouth every 6 hours  -- Indication: For Pain    amLODIPine 5 mg oral tablet  -- 1 tab(s) by mouth once a day  midday  -- Indication: For Home